# Patient Record
Sex: MALE | Race: BLACK OR AFRICAN AMERICAN | NOT HISPANIC OR LATINO | Employment: FULL TIME | URBAN - METROPOLITAN AREA
[De-identification: names, ages, dates, MRNs, and addresses within clinical notes are randomized per-mention and may not be internally consistent; named-entity substitution may affect disease eponyms.]

---

## 2023-12-27 ENCOUNTER — OFFICE VISIT (OUTPATIENT)
Dept: PRIMARY CARE | Facility: CLINIC | Age: 53
End: 2023-12-27
Payer: COMMERCIAL

## 2023-12-27 ENCOUNTER — LAB (OUTPATIENT)
Dept: LAB | Facility: LAB | Age: 53
End: 2023-12-27
Payer: COMMERCIAL

## 2023-12-27 VITALS
OXYGEN SATURATION: 96 % | HEIGHT: 70 IN | BODY MASS INDEX: 31.58 KG/M2 | DIASTOLIC BLOOD PRESSURE: 103 MMHG | RESPIRATION RATE: 18 BRPM | WEIGHT: 220.6 LBS | HEART RATE: 108 BPM | SYSTOLIC BLOOD PRESSURE: 138 MMHG

## 2023-12-27 DIAGNOSIS — H53.9 VISUAL DISTURBANCE: ICD-10-CM

## 2023-12-27 DIAGNOSIS — Z12.5 SCREENING FOR PROSTATE CANCER: ICD-10-CM

## 2023-12-27 DIAGNOSIS — I10 BENIGN ESSENTIAL HYPERTENSION: ICD-10-CM

## 2023-12-27 DIAGNOSIS — F41.1 GENERALIZED ANXIETY DISORDER: ICD-10-CM

## 2023-12-27 DIAGNOSIS — Z12.5 SCREENING FOR PROSTATE CANCER: Primary | ICD-10-CM

## 2023-12-27 DIAGNOSIS — Z00.00 HEALTHCARE MAINTENANCE: ICD-10-CM

## 2023-12-27 DIAGNOSIS — Z12.11 ENCOUNTER FOR SCREENING FOR MALIGNANT NEOPLASM OF COLON: ICD-10-CM

## 2023-12-27 DIAGNOSIS — E78.5 HYPERLIPIDEMIA, UNSPECIFIED HYPERLIPIDEMIA TYPE: ICD-10-CM

## 2023-12-27 PROBLEM — F41.0 ANXIETY ATTACK: Status: ACTIVE | Noted: 2023-12-27

## 2023-12-27 PROBLEM — B94.8 SEQUELAE OF OTHER SPECIFIED INFECTIOUS AND PARASITIC DISEASES: Status: ACTIVE | Noted: 2023-12-27

## 2023-12-27 LAB
ALBUMIN SERPL BCP-MCNC: 4.7 G/DL (ref 3.4–5)
ALP SERPL-CCNC: 78 U/L (ref 33–120)
ALT SERPL W P-5'-P-CCNC: 12 U/L (ref 10–52)
ANION GAP SERPL CALC-SCNC: 15 MMOL/L (ref 10–20)
AST SERPL W P-5'-P-CCNC: 14 U/L (ref 9–39)
BASOPHILS # BLD AUTO: 0.01 X10*3/UL (ref 0–0.1)
BASOPHILS NFR BLD AUTO: 0.1 %
BILIRUB SERPL-MCNC: 0.8 MG/DL (ref 0–1.2)
BUN SERPL-MCNC: 14 MG/DL (ref 6–23)
CALCIUM SERPL-MCNC: 10.7 MG/DL (ref 8.6–10.6)
CHLORIDE SERPL-SCNC: 102 MMOL/L (ref 98–107)
CHOLEST SERPL-MCNC: 270 MG/DL (ref 0–199)
CHOLESTEROL/HDL RATIO: 4
CO2 SERPL-SCNC: 27 MMOL/L (ref 21–32)
CREAT SERPL-MCNC: 1.19 MG/DL (ref 0.5–1.3)
EOSINOPHIL # BLD AUTO: 0.01 X10*3/UL (ref 0–0.7)
EOSINOPHIL NFR BLD AUTO: 0.1 %
ERYTHROCYTE [DISTWIDTH] IN BLOOD BY AUTOMATED COUNT: 12.3 % (ref 11.5–14.5)
EST. AVERAGE GLUCOSE BLD GHB EST-MCNC: 108 MG/DL
GFR SERPL CREATININE-BSD FRML MDRD: 74 ML/MIN/1.73M*2
GLUCOSE SERPL-MCNC: 104 MG/DL (ref 74–99)
HBA1C MFR BLD: 5.4 %
HCT VFR BLD AUTO: 49.2 % (ref 41–52)
HDLC SERPL-MCNC: 68.3 MG/DL
HGB BLD-MCNC: 16.3 G/DL (ref 13.5–17.5)
IMM GRANULOCYTES # BLD AUTO: 0.02 X10*3/UL (ref 0–0.7)
IMM GRANULOCYTES NFR BLD AUTO: 0.3 % (ref 0–0.9)
LDLC SERPL CALC-MCNC: 186 MG/DL
LYMPHOCYTES # BLD AUTO: 1.02 X10*3/UL (ref 1.2–4.8)
LYMPHOCYTES NFR BLD AUTO: 14.7 %
MCH RBC QN AUTO: 29.6 PG (ref 26–34)
MCHC RBC AUTO-ENTMCNC: 33.1 G/DL (ref 32–36)
MCV RBC AUTO: 90 FL (ref 80–100)
MONOCYTES # BLD AUTO: 0.67 X10*3/UL (ref 0.1–1)
MONOCYTES NFR BLD AUTO: 9.7 %
NEUTROPHILS # BLD AUTO: 5.2 X10*3/UL (ref 1.2–7.7)
NEUTROPHILS NFR BLD AUTO: 75.1 %
NON HDL CHOLESTEROL: 202 MG/DL (ref 0–149)
NRBC BLD-RTO: 0 /100 WBCS (ref 0–0)
PLATELET # BLD AUTO: 351 X10*3/UL (ref 150–450)
POTASSIUM SERPL-SCNC: 4.5 MMOL/L (ref 3.5–5.3)
PROT SERPL-MCNC: 7.5 G/DL (ref 6.4–8.2)
PSA SERPL-MCNC: 0.22 NG/ML
RBC # BLD AUTO: 5.5 X10*6/UL (ref 4.5–5.9)
SODIUM SERPL-SCNC: 139 MMOL/L (ref 136–145)
TRIGL SERPL-MCNC: 80 MG/DL (ref 0–149)
TSH SERPL-ACNC: 1.2 MIU/L (ref 0.44–3.98)
VLDL: 16 MG/DL (ref 0–40)
WBC # BLD AUTO: 6.9 X10*3/UL (ref 4.4–11.3)

## 2023-12-27 PROCEDURE — 36415 COLL VENOUS BLD VENIPUNCTURE: CPT

## 2023-12-27 PROCEDURE — 99396 PREV VISIT EST AGE 40-64: CPT | Performed by: INTERNAL MEDICINE

## 2023-12-27 PROCEDURE — 84443 ASSAY THYROID STIM HORMONE: CPT

## 2023-12-27 PROCEDURE — 84153 ASSAY OF PSA TOTAL: CPT

## 2023-12-27 PROCEDURE — 80053 COMPREHEN METABOLIC PANEL: CPT

## 2023-12-27 PROCEDURE — 99214 OFFICE O/P EST MOD 30 MIN: CPT | Performed by: INTERNAL MEDICINE

## 2023-12-27 PROCEDURE — 83036 HEMOGLOBIN GLYCOSYLATED A1C: CPT

## 2023-12-27 PROCEDURE — 1036F TOBACCO NON-USER: CPT | Performed by: INTERNAL MEDICINE

## 2023-12-27 PROCEDURE — 85025 COMPLETE CBC W/AUTO DIFF WBC: CPT

## 2023-12-27 PROCEDURE — 80061 LIPID PANEL: CPT

## 2023-12-27 PROCEDURE — 3080F DIAST BP >= 90 MM HG: CPT | Performed by: INTERNAL MEDICINE

## 2023-12-27 PROCEDURE — 3075F SYST BP GE 130 - 139MM HG: CPT | Performed by: INTERNAL MEDICINE

## 2023-12-27 RX ORDER — VENLAFAXINE HYDROCHLORIDE 75 MG/1
75 CAPSULE, EXTENDED RELEASE ORAL DAILY
Qty: 90 CAPSULE | Refills: 1 | Status: SHIPPED | OUTPATIENT
Start: 2023-12-27 | End: 2024-01-08 | Stop reason: SINTOL

## 2023-12-27 RX ORDER — AMLODIPINE BESYLATE 2.5 MG/1
2.5 TABLET ORAL DAILY
Qty: 90 TABLET | Refills: 0 | Status: SHIPPED | OUTPATIENT
Start: 2023-12-27 | End: 2024-03-13 | Stop reason: SDUPTHER

## 2023-12-27 RX ORDER — VENLAFAXINE HYDROCHLORIDE 75 MG/1
1 CAPSULE, EXTENDED RELEASE ORAL DAILY
COMMUNITY
Start: 2021-02-24 | End: 2023-12-27 | Stop reason: SDUPTHER

## 2023-12-27 ASSESSMENT — ENCOUNTER SYMPTOMS
DIFFICULTY URINATING: 0
SINUS PRESSURE: 0
BACK PAIN: 0
CHILLS: 0
ARTHRALGIAS: 0
DYSURIA: 0
DIARRHEA: 0
WEAKNESS: 0
APPETITE CHANGE: 0
COUGH: 0
ABDOMINAL DISTENTION: 0
VOMITING: 0
BLOOD IN STOOL: 0
MYALGIAS: 0
RHINORRHEA: 0
NUMBNESS: 0
NECK PAIN: 0
ABDOMINAL PAIN: 0
FEVER: 0
HEMATURIA: 0
CONSTIPATION: 0
SHORTNESS OF BREATH: 0
SORE THROAT: 0
JOINT SWELLING: 0
NAUSEA: 0
DIZZINESS: 0
HEADACHES: 0
FATIGUE: 0
PALPITATIONS: 0
DIAPHORESIS: 0
FREQUENCY: 0
NECK STIFFNESS: 0
LIGHT-HEADEDNESS: 0
WHEEZING: 0

## 2023-12-27 NOTE — ASSESSMENT & PLAN NOTE
-We discussed nonpharmacologic and pharmacologic mechanisms to cope with stress and anxiety  -[facility] referral for psychotherapy   -Start venlafaxine 75 mg daily  -Pt counselled on importance of taking medication for at least 6-8 weeks to determine effect  -Educated on possible adverse effects.

## 2023-12-27 NOTE — ASSESSMENT & PLAN NOTE
- LDL above goal of 90  -Repeat lipid panel  - Educated on the adverse effects of elevated cholesterol levels, benefits of medications, healthy diet and exercise

## 2023-12-27 NOTE — ASSESSMENT & PLAN NOTE
-BP above target goal 130/80  -CMP, TSH ordered  -Start amlodipine 2.5 mg daily   -Keep BP log  -Educated about adverse effects of uncontrolled blood pressure,    -Low sodium diet, regular exercise recommended

## 2023-12-27 NOTE — PROGRESS NOTES
"Subjective   Patient ID: Saud He is a 53 y.o. male who presents for Establish Care (Annual exam /Anxiety episodes /Colonoscopy referral/Blurred vision ).    HPI   He presents with his wife to establish care. 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     Review of Systems   Constitutional:  Negative for appetite change, chills, diaphoresis, fatigue and fever.   HENT:  Negative for congestion, ear discharge, ear pain, hearing loss, postnasal drip, rhinorrhea, sinus pressure, sore throat and tinnitus.    Eyes:  Negative for visual disturbance.   Respiratory:  Negative for cough, shortness of breath and wheezing.    Cardiovascular:  Negative for chest pain, palpitations and leg swelling.   Gastrointestinal:  Negative for abdominal distention, abdominal pain, blood in stool, constipation, diarrhea, nausea and vomiting.   Genitourinary:  Negative for decreased urine volume, difficulty urinating, dysuria, frequency, hematuria and urgency.   Musculoskeletal:  Negative for arthralgias, back pain, gait problem, joint swelling, myalgias, neck pain and neck stiffness.   Skin:  Negative for rash.   Neurological:  Negative for dizziness, weakness, light-headedness, numbness and headaches.       Objective   BP (!) 138/103 Comment: second attempt  Pulse 108   Resp 18   Ht 1.778 m (5' 10\")   Wt 100 kg (220 lb 9.6 oz)   SpO2 96%   BMI 31.65 kg/m²     Physical Exam  Vitals reviewed.   Constitutional:       Appearance: Normal appearance. He is normal weight.   HENT:      Right Ear: Tympanic membrane and external ear normal.      Left Ear: Tympanic membrane and external ear normal.   Eyes:      Extraocular Movements: Extraocular movements intact.      Conjunctiva/sclera: Conjunctivae normal.      Pupils: Pupils are equal, round, and reactive to light.   Cardiovascular:      Rate and Rhythm: Normal rate and regular rhythm.      Pulses: Normal pulses.   Pulmonary:      Effort: Pulmonary effort " is normal.      Breath sounds: Normal breath sounds.   Abdominal:      General: Bowel sounds are normal.      Palpations: Abdomen is soft.   Musculoskeletal:         General: Normal range of motion.      Cervical back: Normal range of motion.   Skin:     General: Skin is warm and dry.   Neurological:      General: No focal deficit present.      Mental Status: He is oriented to person, place, and time.   Psychiatric:         Mood and Affect: Mood normal.         Behavior: Behavior normal.       Assessment/Plan   Problem List Items Addressed This Visit             ICD-10-CM    Benign essential hypertension I10     -BP above target goal 130/80  -CMP, TSH ordered  -Start amlodipine 2.5 mg daily   -Keep BP log  -Educated about adverse effects of uncontrolled blood pressure,    -Low sodium diet, regular exercise recommended           Relevant Medications    amLODIPine (Norvasc) 2.5 mg tablet    Other Relevant Orders    Referral to Ophthalmology    Hyperlipidemia E78.5     - LDL above goal of 90  -Repeat lipid panel  - Educated on the adverse effects of elevated cholesterol levels, benefits of medications, healthy diet and exercise          Generalized anxiety disorder F41.1     -We discussed nonpharmacologic and pharmacologic mechanisms to cope with stress and anxiety  -[facility] referral for psychotherapy   -Start venlafaxine 75 mg daily  -Pt counselled on importance of taking medication for at least 6-8 weeks to determine effect  -Educated on possible adverse effects.            Relevant Medications    venlafaxine XR (Effexor-XR) 75 mg 24 hr capsule    Other Relevant Orders    Follow Up In [facility]    Visual disturbance H53.9    Relevant Orders    Referral to Ophthalmology    Encounter for screening for malignant neoplasm of colon Z12.11    Relevant Orders    Cologuard® colon cancer screening    Screening for prostate cancer - Primary Z12.5    Relevant Orders     Prostate Specific Antigen, Screen    Healthcare maintenance Z00.00    Relevant Orders    CBC and Auto Differential    Comprehensive Metabolic Panel    Hemoglobin A1C    Lipid Panel    TSH with reflex to Free T4 if abnormal   RTC in 6-8 weeks

## 2023-12-28 ENCOUNTER — TELEPHONE (OUTPATIENT)
Dept: PRIMARY CARE | Facility: CLINIC | Age: 53
End: 2023-12-28
Payer: COMMERCIAL

## 2023-12-28 NOTE — RESULT ENCOUNTER NOTE
Your Cholesterol levels are elevated.  I recommend eating a healthy diet, avoiding foods high in saturated fats and processed sugars, as well as exercising regularly.  If levels remain elevated at next repeat we can discuss starting medications to decrease cholesterol levels as well as risk of stroke and heart attack.     Labs were otherwise normal.

## 2024-01-04 ENCOUNTER — APPOINTMENT (OUTPATIENT)
Dept: PRIMARY CARE | Facility: CLINIC | Age: 54
End: 2024-01-04
Payer: COMMERCIAL

## 2024-01-08 ENCOUNTER — TELEMEDICINE (OUTPATIENT)
Dept: PRIMARY CARE | Facility: CLINIC | Age: 54
End: 2024-01-08
Payer: COMMERCIAL

## 2024-01-08 DIAGNOSIS — I10 BENIGN ESSENTIAL HYPERTENSION: ICD-10-CM

## 2024-01-08 DIAGNOSIS — F41.1 GENERALIZED ANXIETY DISORDER: Primary | ICD-10-CM

## 2024-01-08 PROCEDURE — RXMED WILLOW AMBULATORY MEDICATION CHARGE

## 2024-01-08 PROCEDURE — 99213 OFFICE O/P EST LOW 20 MIN: CPT | Performed by: INTERNAL MEDICINE

## 2024-01-08 RX ORDER — BUSPIRONE HYDROCHLORIDE 15 MG/1
15 TABLET ORAL 2 TIMES DAILY
Qty: 180 TABLET | Refills: 0 | Status: SHIPPED | OUTPATIENT
Start: 2024-01-08 | End: 2024-03-13 | Stop reason: SDUPTHER

## 2024-01-08 NOTE — PROGRESS NOTES
Subjective   Patient ID: Saud He is a 53 y.o. male who presents for Follow-up (Medication concerns).    HPI   He was having erectile dysfunction on venlafaxine.    Buspirone      Review of Systems  12 point ROS completed, negative except for mentioned in HPI      Objective   There were no vitals taken for this visit.    Physical Exam  No physical exam was done due to the virtual nature of this visit.      Assessment/Plan   Problem List Items Addressed This Visit             ICD-10-CM    Benign essential hypertension I10     Patient reporting BP well controlled on amlodipine 2.5 mg daily          Generalized anxiety disorder - Primary F41.1     Discontinue venlafaxine due to erectile dysfunction  Start buspirone 15 mg BID   Referred to [facility]            Relevant Medications    busPIRone (Buspar) 15 mg tablet    Other Relevant Orders    Follow Up In [facility]     RTC in 2 mo

## 2024-01-08 NOTE — ASSESSMENT & PLAN NOTE
Discontinue venlafaxine due to erectile dysfunction  Start buspirone 15 mg BID   Referred to [facility]

## 2024-01-09 ENCOUNTER — PHARMACY VISIT (OUTPATIENT)
Dept: PHARMACY | Facility: CLINIC | Age: 54
End: 2024-01-09
Payer: COMMERCIAL

## 2024-01-16 ENCOUNTER — SOCIAL WORK (OUTPATIENT)
Dept: PRIMARY CARE | Facility: CLINIC | Age: 54
End: 2024-01-16
Payer: COMMERCIAL

## 2024-01-16 ASSESSMENT — PATIENT HEALTH QUESTIONNAIRE - PHQ9
SUM OF ALL RESPONSES TO PHQ QUESTIONS 1-9: 0
7. TROUBLE CONCENTRATING ON THINGS, SUCH AS READING THE NEWSPAPER OR WATCHING TELEVISION: NOT AT ALL
4. FEELING TIRED OR HAVING LITTLE ENERGY: NOT AT ALL
1. LITTLE INTEREST OR PLEASURE IN DOING THINGS: NOT AT ALL
10. IF YOU CHECKED OFF ANY PROBLEMS, HOW DIFFICULT HAVE THESE PROBLEMS MADE IT FOR YOU TO DO YOUR WORK, TAKE CARE OF THINGS AT HOME, OR GET ALONG WITH OTHER PEOPLE: NOT DIFFICULT AT ALL
9. THOUGHTS THAT YOU WOULD BE BETTER OFF DEAD, OR OF HURTING YOURSELF: NOT AT ALL
6. FEELING BAD ABOUT YOURSELF - OR THAT YOU ARE A FAILURE OR HAVE LET YOURSELF OR YOUR FAMILY DOWN: NOT AT ALL
8. MOVING OR SPEAKING SO SLOWLY THAT OTHER PEOPLE COULD HAVE NOTICED. OR THE OPPOSITE, BEING SO FIGETY OR RESTLESS THAT YOU HAVE BEEN MOVING AROUND A LOT MORE THAN USUAL: NOT AT ALL
5. POOR APPETITE OR OVEREATING: NOT AT ALL
3. TROUBLE FALLING OR STAYING ASLEEP: NOT AT ALL
2. FEELING DOWN, DEPRESSED OR HOPELESS: NOT AT ALL
SUM OF ALL RESPONSES TO PHQ9 QUESTIONS 1 & 2: 0

## 2024-01-16 ASSESSMENT — ANXIETY QUESTIONNAIRES
6. BECOMING EASILY ANNOYED OR IRRITABLE: NOT AT ALL
4. TROUBLE RELAXING: NOT AT ALL
2. NOT BEING ABLE TO STOP OR CONTROL WORRYING: SEVERAL DAYS
3. WORRYING TOO MUCH ABOUT DIFFERENT THINGS: NOT AT ALL
IF YOU CHECKED OFF ANY PROBLEMS ON THIS QUESTIONNAIRE, HOW DIFFICULT HAVE THESE PROBLEMS MADE IT FOR YOU TO DO YOUR WORK, TAKE CARE OF THINGS AT HOME, OR GET ALONG WITH OTHER PEOPLE: SOMEWHAT DIFFICULT
5. BEING SO RESTLESS THAT IT IS HARD TO SIT STILL: NOT AT ALL
7. FEELING AFRAID AS IF SOMETHING AWFUL MIGHT HAPPEN: NOT AT ALL
1. FEELING NERVOUS, ANXIOUS, OR ON EDGE: MORE THAN HALF THE DAYS
GAD7 TOTAL SCORE: 3
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
## 2024-01-18 ENCOUNTER — DOCUMENTATION (OUTPATIENT)
Dept: BEHAVIORAL HEALTH | Facility: CLINIC | Age: 54
End: 2024-01-18
Payer: COMMERCIAL

## 2024-01-18 NOTE — PROGRESS NOTES
[facility] Psychiatry Consult Note     Saud He is a 53 y.o., referred to Collaborative Care for symptoms of depression and anxiety. I have reviewed the patient with the behavioral health manager and reviewed the patient's electronic record.    Recommendations:   [facility] will conduct therapy for anxiety, especially with exposures, and encourage more intense cardio exercise, which decreases anxiety sensitivity.  Please follow medication algorithm here.  1. If no improvement with buspirone, taper and replace with TCA. If partial but insufficient improvement with buspirone, may augment with TCA: Clompiramine has best evidence for OCD.  2. May augment or replace subsequently with either pregabalin or a low-dose antipsychotic with anxiolytic properties, such as aripiprazole (low metabolic adverse effects) or quetiapine (more sedative effects can help with insomnia), usually for short-term use.    Clomipramine (Anafranil)  ====================  DOSING INFORMATION:   Week 1: Baseline EKG (if any history of cardiac disease, history of arrhythmias, or over 66 y/o), pulse, HR, weight. Consider BMP for baseline sodium in older adults. Start: 25 mg qHS-should be given with food and dose may be divided to limit GI effects.  Week 2: increase to 50 mg qHS.   Week 3: increase to 75 mg qHS.   Week 4: increase dose to an Initial Target Dose of 100 mg qHS.   Max Dose: 250 mg/day.   Discontinuation: 25% per week to 25% per month depending on length of treatment in order to minimize withdrawal symptoms and relapse.  MONITORING:   EKG (pretreatment, initial, and annual-if any history of cardiac disease, history of arrhythmias or over 66 y/o), pulse, HR, weight. Consider posttreatment BMP to rule out hyponatremia in older adults. Blood test for serum level available with defined therapeutic range: 150- 300 ng/ml; Toxic > 500 ng/ml. Blood draw timed to achieve a trough level, goal approximately 12 hours after last dose.  GENERAL INFORMATION:    Mechanism of Action: TCA, tertiary amine: serotonin >> NE reuptake inhibitor.   FDA Indications: OCD.   Off-Label Indications: Depression.   Pharmacokinetics: T½: 32 hr. Common Side Effects (OCD): dry mouth (84%), somnolence (54%), dizziness (54%), tremor (54%), headache (52%), constipation (47%), ejaculation failure (42%), fatigue (39%), nausea (33%), increased sweating (29%), dyspepsia (22%), libido change (20%), impotence (20%), weight gain (18%), nervousness (18%), abnormal vision (18%), micturition disorder (14%), increased appetite (11%), paresthesia (9%), memory impairment (9%), anxiety (9%), rash (8%), vomiting (7%), twitching (7%), flatulence (6%), impaired concentration (5%), depression (5%). Black Box Warning: increased SI in patients <24 y/o.   Contraindications: Use of a MAOI within 14 days of stopping Anafranil, concurrent use of a MAOI including drugs with significant MAOI activity (e.g., linezolid), or use of Anafranil within 14 days of stopping a MAOI, use during the acute recovery period after a MI.   Warnings and Precautions: Clinical worsening and suicide risk, highly lethal in overdose, activation of hypomania/ramona, serotonin syndrome, seizures, orthostatic hypotension, caution in patients with known cardiovascular disease, neuropsychiatric symptoms, caution and monitoring in patients with liver disease, decreased blood cell count, hyperthermia, sexual dysfunction, weight gain, caution in patients with hyperthyroidism/thyroid supplementation, increased intraocular pressure, a history of narrowangel glaucoma, urinary retention, with tumors of the adrenal medulla, or with impaired renal function, discontinuation syndrome. Potentially prolongs QTc so caution is advised in patients with cardiovascular disease.   Metabolism/Pharmacogenomics: Metabolized by 2D6 to less active metabolites and by 2C19 to active metabolites. Use caution with 2D6 poor metabolizers.   Significant drug-drug interactions:  use with caution with 2D6 inhibitors (e.g., fluoxetine and paroxetine); check all drug-drug interactions before prescribing.    Pregabalin (Lyrica)  ================  - has effects for anxiety reportedly comparable to benzodiazepines, but clinical experience varies.  - Dosin mg/day in 2 to 3 divided doses; may increase based on response and tolerability at weekly intervals in increments of 150 mg/day up to a usual dose of 300 mg/day. May further increase up to 600 mg/day; however, additional benefit of doses >300 mg/day is uncertain  - AEs can include neuro (sedation, dizziness, tremor, irritability); dry mouth; blurred vision, and peripheral edema.  - The  does report potential weight gain, but this was 5 pounds over the course of two years among patients with advanced diabetes (neuropathy).  - Pregabalin does cross the placenta, but studies of effects on pregnancy are too limited to see any effect and research is ongoing.  - Pregabalin does have abuse potential, primarily for patients with pre-existing substance use disorders, and use in that population must be cautious and entail documentation of risks and benefits.    Quetiapine (Seroquel)  ==================  DOSING INFORMATION:   Assess baseline weight, waist circumference, blood pressure, fasting plasma glucose, fasting lipid profile, CBC (for baseline WBC), EKG (to assess QTc) and AIMS test.   Initiation for Anxiety: Start with 25 mg at bedtime and increase by up to 50 mg weekly to a target dose of  mg per day.  ONGOING MONITORING:   EKG at target dose (at least once to assess QTc). At 4 weeks: Weight, Fasting lipid profile.   At 8 weeks: weight.   At 12 weeks: weight, blood pressure, fasting plasma glucose, fasting lipid profile.   Quarterly thereafter: weight.   Annually /ongoing: Waist circumference, weight, blood pressure, fasting plasma glucose, fasting lipid profile and AIMS test. Consider checking for cataracts.  GENERAL  INFORMATION: Atypical antipsychotic.   FDA Indications: Schizophrenia (IR, XR), Bipolar I - manic (IR, XR), Bipolar I - mixed (XR), Bipolar disorder - depressive episode (IR, XR), Bipolar maintenance as adjunctive to lithium or divalproex (IR, XR), Adjunctive treatment of MDD (XR).   Off-Label Indications: Anxiety disorders augmentation.   Pharmacokinetics: T½ = 6 hr (IR); 7 hrs (XR).   Common Side Effects (Schizophrenia and Bipolar Christina-Seroquel IR): Headache (21%), somnolence (18%), dizziness (11%), dry mouth (9%), constipation (8%), weight gain (5%), dyspepsia (5%), ALT increased (5%).   Common Side Effects (Bipolar Depression-Seroquel XR): Somnolence (52%), dry mouth (37%), Increased appetite (12%), dyspepsia (7%), weight gain (7%), fatigue (6%). Black Box Warnings: (1) Increased mortality in elderly patients with dementia related psychosis. (2) Increased risk of suicidal thinking and behavior in children, adolescents, and young adults taking antidepressants. (3) Monitor for worsening and emergence of suicidal thoughts and behaviors.       Diagnosis:  CARMINA vs OCD with panic attacks  Rule out ASD  His primary concerns are the panic attacks and anxiety about having them.    Requires prompting/encouragement in order to answer questions, complete paperwork, etc.  Endorses perfectionism  Panic attacks and anxiety about having anxiety when not at home, starting when Trump was elected.  Flat affect but laughed once  Witnessed traumas growing up; best friend killed in front of him  Adverse effects with SSRI and SNRI trials      Patient Health Questionnaire-9 Score: 0 (1/16/2024  1:00 PM)  CARMINA-7 Total Score: 3 (1/16/2024  1:00 PM)      The above treatment considerations and suggestions are based on consultations with the patient's care manager and a review of information available in the electronic medical record. I have not personally examined the patient. All recommendations should be implemented with consideration of  the patient's relevant prior history and current clinical status. Please feel free to call me with any questions about the care of this patient. I can easily be reached through [employer] Chat.

## 2024-01-30 ENCOUNTER — DOCUMENTATION (OUTPATIENT)
Dept: PRIMARY CARE | Facility: CLINIC | Age: 54
End: 2024-01-30

## 2024-01-30 ENCOUNTER — SOCIAL WORK (OUTPATIENT)
Dept: PRIMARY CARE | Facility: CLINIC | Age: 54
End: 2024-01-30
Payer: COMMERCIAL

## 2024-01-30 DIAGNOSIS — F41.1 GENERALIZED ANXIETY DISORDER WITH PANIC ATTACKS: Primary | ICD-10-CM

## 2024-01-30 DIAGNOSIS — F41.0 GENERALIZED ANXIETY DISORDER WITH PANIC ATTACKS: Primary | ICD-10-CM

## 2024-01-30 PROCEDURE — 99494 1ST/SBSQ PSYC COLLAB CARE: CPT | Performed by: INTERNAL MEDICINE

## 2024-01-30 PROCEDURE — 99492 1ST PSYC COLLAB CARE MGMT: CPT | Performed by: INTERNAL MEDICINE

## 2024-01-30 NOTE — PROGRESS NOTES
"Collaborative Care (CoCM)  Progress Note    Type of Interaction: In Office    Start Time: 11a    End Time: 12p        Appointment: Scheduled    Reason for Visit: No chief complaint on file.         Interval History / Patient Symptoms: Patient reports he has been able to manage his everyday life better. Patient reports he is still able to function. Patient reports his symptoms have decreased and are not as \"bothersome\" as they had been.  Patient BHM discussed anxiety and the definition. Patient has been taking his meds daily. Patient reports his main fear is having the anxiety. Patient reports he as been working on understanding his thoughts and connections to his behaviors.Patient describes the wave of emotions can overwhelm him in the moment. Patient states as soon as it comes, it will leave. Patient and BHM discussed patient doing treadmill exercises 2xs per week for cardio and balancing yoga. Patient and BHM discussed avoidance and he does not feel like he has a hard time dealing with social activities. Patient and BHM discussed the cycle of anxiety. Patient and BHM discussed decompressing and winding down after work. Patient reports he he does not feel settled until he takes care of what needs to be taken care of at home and then he can relax. Patient reports he is always anticipating the next bad thing that is going to happen. Patient reports he functioned in the past by imaging the worst case scenario. Patient and BHM discussed the hand out what could happen vs. What will happen. Patient reports he often about financially situations that could eventually cost him money in the end. Patient is a worrier  about most things.  Patient and discussed that we can not control other people's actions, reactions and behaviors.     Patient Health Questionnaire-9 Score: 0 (1/16/2024  1:00 PM)  CARMINA-7 Total Score: 3 (1/16/2024  1:00 PM)        Interventions Provided: Behavioral Activation, Develop Coping Strategies, Review " Progress/Goals Stress Management, and Mindfulness      Progress Made: Mixed    Response to Intervention: Patient is calm and cooperative.  Patient participates in processing how his thoughts impact his feelings and reactions and behaviors. Patient reports that he is an advocate for mental health and wishes it was not such a stigma. Patient asks questions about his care and is an active advocate for himself.                  Follow Up / Next Appointment:      2/12/2024@[facility]

## 2024-02-01 LAB — NONINV COLON CA DNA+OCC BLD SCRN STL QL: NEGATIVE

## 2024-02-12 ENCOUNTER — SOCIAL WORK (OUTPATIENT)
Dept: PRIMARY CARE | Facility: CLINIC | Age: 54
End: 2024-02-12
Payer: COMMERCIAL

## 2024-02-12 ASSESSMENT — PATIENT HEALTH QUESTIONNAIRE - PHQ9
SUM OF ALL RESPONSES TO PHQ QUESTIONS 1-9: 0
2. FEELING DOWN, DEPRESSED OR HOPELESS: NOT AT ALL
10. IF YOU CHECKED OFF ANY PROBLEMS, HOW DIFFICULT HAVE THESE PROBLEMS MADE IT FOR YOU TO DO YOUR WORK, TAKE CARE OF THINGS AT HOME, OR GET ALONG WITH OTHER PEOPLE: NOT DIFFICULT AT ALL
SUM OF ALL RESPONSES TO PHQ9 QUESTIONS 1 & 2: 0
9. THOUGHTS THAT YOU WOULD BE BETTER OFF DEAD, OR OF HURTING YOURSELF: NOT AT ALL
5. POOR APPETITE OR OVEREATING: NOT AT ALL
6. FEELING BAD ABOUT YOURSELF - OR THAT YOU ARE A FAILURE OR HAVE LET YOURSELF OR YOUR FAMILY DOWN: NOT AT ALL
1. LITTLE INTEREST OR PLEASURE IN DOING THINGS: NOT AT ALL
8. MOVING OR SPEAKING SO SLOWLY THAT OTHER PEOPLE COULD HAVE NOTICED. OR THE OPPOSITE, BEING SO FIGETY OR RESTLESS THAT YOU HAVE BEEN MOVING AROUND A LOT MORE THAN USUAL: NOT AT ALL
7. TROUBLE CONCENTRATING ON THINGS, SUCH AS READING THE NEWSPAPER OR WATCHING TELEVISION: NOT AT ALL
3. TROUBLE FALLING OR STAYING ASLEEP: NOT AT ALL
4. FEELING TIRED OR HAVING LITTLE ENERGY: NOT AT ALL

## 2024-02-12 ASSESSMENT — ANXIETY QUESTIONNAIRES
GAD7 TOTAL SCORE: 4
3. WORRYING TOO MUCH ABOUT DIFFERENT THINGS: NOT AT ALL
4. TROUBLE RELAXING: NOT AT ALL
1. FEELING NERVOUS, ANXIOUS, OR ON EDGE: SEVERAL DAYS
IF YOU CHECKED OFF ANY PROBLEMS ON THIS QUESTIONNAIRE, HOW DIFFICULT HAVE THESE PROBLEMS MADE IT FOR YOU TO DO YOUR WORK, TAKE CARE OF THINGS AT HOME, OR GET ALONG WITH OTHER PEOPLE: NOT DIFFICULT AT ALL
5. BEING SO RESTLESS THAT IT IS HARD TO SIT STILL: SEVERAL DAYS
6. BECOMING EASILY ANNOYED OR IRRITABLE: SEVERAL DAYS
7. FEELING AFRAID AS IF SOMETHING AWFUL MIGHT HAPPEN: SEVERAL DAYS
2. NOT BEING ABLE TO STOP OR CONTROL WORRYING: NOT AT ALL

## 2024-02-12 NOTE — PROGRESS NOTES
[facility]  [facility]    Type of Interaction: In Office    Start Time: 11a    End Time: 12p        Appointment: Scheduled    Reason for Visit: No chief complaint on file.   CARMINA      Interval History / Patient Symptoms: Patient has been doing research on the symptoms of anxiety. Patient has been working on letting things go that would typically upset him. Patient reports there are times he wakes up anxious and he can explain how his body reacts to what he is thinking.  Patient reports he can over think the process of making decisions. Patient reports he fears anxiety and the way it makes him feel. Patient reports he worries about how the anxiety makes him feel and he is worried about those feelings arising.  Patient has been going to the track and to the park to do cardio.  Patient reports he has been feeling good. Patient reports his sleep has been good. Patient reports his energy has been better. Patient has noticed his interest has peaked and is better than it was when he first started speaking with M. Patient reports he has been to the track and the park to walk. Patient is doing yoga daily and patient does the meditation a few times per week for 20 minutes. Patient reports he has been walking several times per week. Patient wonders if his eating can be tied into his anxiety. Patient reports when his mom passed they did a wellness check on her. Patient reports several months later he lost his dad and watched him pass away. Patient reports his past choices and close call situations didn't trigger anxiety for him.  Patient wonders what causes him to be anxious and is something contributing to it that he has done. Patient and BHM reviewed cognitive distortions. Patient discussed how difficult it can be for him to make a decision. Patient gave examples of how he ruminates over a decision. Patient reports he is not spontaneous. Patient and BHM discussed mindfulness and living in the moment.  Patient often times wants to know what his triggers are and if he can stop them. Discussed with patient that at times he may not know what triggers him and that facing the anxiety in the moment and identifying how he is feeling and not avoiding the symptoms at that time will help.    No data recorded      Interventions Provided: Develop Coping Strategies and Review Progress/Goals Stress Management      Progress Made: Significant    Response to Intervention:   Patient is engaging. Patient asks clarifying questions in order to manage his symptoms. Patient has been doing more cardio exercises and increased his walking as well.                 Follow Up / Next Appointment:    2/27/2024@[facility]

## 2024-02-15 ENCOUNTER — APPOINTMENT (OUTPATIENT)
Dept: PRIMARY CARE | Facility: CLINIC | Age: 54
End: 2024-02-15
Payer: COMMERCIAL

## 2024-02-27 ENCOUNTER — SOCIAL WORK (OUTPATIENT)
Dept: PRIMARY CARE | Facility: CLINIC | Age: 54
End: 2024-02-27
Payer: COMMERCIAL

## 2024-02-27 NOTE — PROGRESS NOTES
[facility]  [facility]    Type of Interaction: In Office    Start Time: 11a    End Time: 12p        Appointment: Scheduled    Reason for Visit: No chief complaint on file.  CARMINA      Interval History / Patient Symptoms: Patient reports he felt on edge, nauseous, light headed yesterday. Patient is not certain where his anxiety comes from. BHM focused with patient on mindfulness and living in the moment examples and techniques. Patient states yesterday, the wave of emotions was not present as it has been in the past. Patient reports he is confused as to why he becomes anxious for no particular reason. Patient reports he used breathing techniques to help himself get through the feelings. Patient reports he can become stuck in his thoughts and he has a hard time letting go of a thought and will ruminate in it.  BHM and patient discussed the idea 'are the thoughts helpful'?  and he recognizes that most of the time the thoughts are not helpful. Patient and BHM discussed patient using cardio walking to help with decreasing his BP. Patient reports he eats well and sleeps well and has been doing yoga and cardio. Patient states he is med compliant. Patient reports he is working on changing his lifestyle in order to be healthy mentally and physically.  Patient and BHM discussed cognitive distortions as well as the cycle of anxiety. Patient and BHM discussed things that can make him unsettled at home. Patient does not like things to be out of place or messy. BHM and patient discussed if this disturbs his ability to relax and function. Patient reports it depends, but it can at times. [facility] is working with patient to expose him to uncomfortable situations. Patient is going to work on leaving things messy or untidy and to rate how he is feeling as far as his anxiety. Patient reports at times he can get uncomfortable getting on the freeway and driving but has has made an effort not to avoid doing this. We discussed  patient labeling his emotions as they happen and to continue using the freeway. We discussed how avoidance can make things worse over time and how people can get caught in the cycle of avoidance making things worse.    No data recorded      Interventions Provided: Behavioral Activation, Develop Coping Strategies, Review Progress/Goals Stress Management, and Mindfulness      Progress Made: Mixed    Response to Intervention:  Patient is engaging and willing to process. Patient does recognize that at times his own thoughts can hinder his process of moving forward and hinder his thinking. He knows this in turn can impact his symptoms of anxiety.                  Follow Up / Next Appointment:    3/12/2024@12:15p

## 2024-02-29 ENCOUNTER — DOCUMENTATION (OUTPATIENT)
Dept: PRIMARY CARE | Facility: CLINIC | Age: 54
End: 2024-02-29
Payer: COMMERCIAL

## 2024-02-29 DIAGNOSIS — F41.1 GAD (GENERALIZED ANXIETY DISORDER): Primary | ICD-10-CM

## 2024-02-29 PROCEDURE — 99494 1ST/SBSQ PSYC COLLAB CARE: CPT | Performed by: INTERNAL MEDICINE

## 2024-02-29 PROCEDURE — 99493 SBSQ PSYC COLLAB CARE MGMT: CPT | Performed by: INTERNAL MEDICINE

## 2024-03-11 ENCOUNTER — OFFICE VISIT (OUTPATIENT)
Dept: PRIMARY CARE | Facility: CLINIC | Age: 54
End: 2024-03-11
Payer: COMMERCIAL

## 2024-03-11 VITALS
WEIGHT: 218 LBS | RESPIRATION RATE: 18 BRPM | HEART RATE: 88 BPM | DIASTOLIC BLOOD PRESSURE: 90 MMHG | SYSTOLIC BLOOD PRESSURE: 134 MMHG | BODY MASS INDEX: 31.21 KG/M2 | HEIGHT: 70 IN | OXYGEN SATURATION: 98 %

## 2024-03-11 DIAGNOSIS — F41.1 GENERALIZED ANXIETY DISORDER: ICD-10-CM

## 2024-03-11 DIAGNOSIS — I10 BENIGN ESSENTIAL HYPERTENSION: Primary | ICD-10-CM

## 2024-03-11 PROCEDURE — 99214 OFFICE O/P EST MOD 30 MIN: CPT | Performed by: INTERNAL MEDICINE

## 2024-03-11 PROCEDURE — 3075F SYST BP GE 130 - 139MM HG: CPT | Performed by: INTERNAL MEDICINE

## 2024-03-11 PROCEDURE — 1036F TOBACCO NON-USER: CPT | Performed by: INTERNAL MEDICINE

## 2024-03-11 PROCEDURE — 3080F DIAST BP >= 90 MM HG: CPT | Performed by: INTERNAL MEDICINE

## 2024-03-11 ASSESSMENT — ENCOUNTER SYMPTOMS
NUMBNESS: 0
APPETITE CHANGE: 0
NECK STIFFNESS: 0
SHORTNESS OF BREATH: 0
CONSTIPATION: 0
DYSURIA: 0
FEVER: 0
VOMITING: 0
DIZZINESS: 0
SINUS PRESSURE: 0
WEAKNESS: 0
RHINORRHEA: 0
WHEEZING: 0
FATIGUE: 0
ABDOMINAL PAIN: 0
DIAPHORESIS: 0
MYALGIAS: 0
DIFFICULTY URINATING: 0
BLOOD IN STOOL: 0
CHILLS: 0
NECK PAIN: 0
NAUSEA: 0
ABDOMINAL DISTENTION: 0
DIARRHEA: 0
JOINT SWELLING: 0
LOSS OF SENSATION IN FEET: 0
PALPITATIONS: 0
OCCASIONAL FEELINGS OF UNSTEADINESS: 0
BACK PAIN: 0
FREQUENCY: 0
SORE THROAT: 0
COUGH: 0
HEMATURIA: 0
LIGHT-HEADEDNESS: 0
ARTHRALGIAS: 0
DEPRESSION: 0
HEADACHES: 0

## 2024-03-11 ASSESSMENT — PATIENT HEALTH QUESTIONNAIRE - PHQ9
1. LITTLE INTEREST OR PLEASURE IN DOING THINGS: NOT AT ALL
SUM OF ALL RESPONSES TO PHQ9 QUESTIONS 1 AND 2: 0
2. FEELING DOWN, DEPRESSED OR HOPELESS: NOT AT ALL

## 2024-03-11 NOTE — PROGRESS NOTES
"Subjective   Patient ID: Saud He is a 53 y.o. male who presents for Follow-up.    HPI   He presents for follow-up. He reports anxiety has been doing better with psychotherapy, using coping mechanisms.     Review of Systems   Constitutional:  Negative for appetite change, chills, diaphoresis, fatigue and fever.   HENT:  Negative for congestion, ear discharge, ear pain, hearing loss, postnasal drip, rhinorrhea, sinus pressure, sore throat and tinnitus.    Eyes:  Negative for visual disturbance.   Respiratory:  Negative for cough, shortness of breath and wheezing.    Cardiovascular:  Negative for chest pain, palpitations and leg swelling.   Gastrointestinal:  Negative for abdominal distention, abdominal pain, blood in stool, constipation, diarrhea, nausea and vomiting.   Genitourinary:  Negative for decreased urine volume, difficulty urinating, dysuria, frequency, hematuria and urgency.   Musculoskeletal:  Negative for arthralgias, back pain, gait problem, joint swelling, myalgias, neck pain and neck stiffness.   Skin:  Negative for rash.   Neurological:  Negative for dizziness, weakness, light-headedness, numbness and headaches.         Objective   /90   Pulse 88   Resp 18   Ht 1.778 m (5' 10\")   Wt 98.9 kg (218 lb)   SpO2 98%   BMI 31.28 kg/m²     Physical Exam  Vitals reviewed.   Constitutional:       Appearance: Normal appearance. He is normal weight.   HENT:      Right Ear: Tympanic membrane and external ear normal.      Left Ear: Tympanic membrane and external ear normal.   Eyes:      Extraocular Movements: Extraocular movements intact.      Conjunctiva/sclera: Conjunctivae normal.      Pupils: Pupils are equal, round, and reactive to light.   Cardiovascular:      Rate and Rhythm: Normal rate and regular rhythm.      Pulses: Normal pulses.   Pulmonary:      Effort: Pulmonary effort is normal.      Breath sounds: Normal breath sounds.   Abdominal:      General: Bowel sounds are normal.      " Palpations: Abdomen is soft.   Musculoskeletal:         General: Normal range of motion.      Cervical back: Normal range of motion.   Skin:     General: Skin is warm and dry.   Neurological:      General: No focal deficit present.      Mental Status: He is oriented to person, place, and time.   Psychiatric:         Mood and Affect: Mood normal.         Behavior: Behavior normal.           Assessment/Plan   Problem List Items Addressed This Visit             ICD-10-CM    Benign essential hypertension - Primary I10     BP log reviewed   Continue amlodipine 2.5 mg daily   Low sodium diet          Generalized anxiety disorder F41.1     Discontinue venlafaxine due to erectile dysfunction  Continue buspirone 15 mg BID   Continue psychotherapy with Odalys Douglas           RTSHANA in 6 mo

## 2024-03-11 NOTE — ASSESSMENT & PLAN NOTE
Discontinue venlafaxine due to erectile dysfunction  Continue buspirone 15 mg BID   Continue psychotherapy with Odalys Douglas

## 2024-03-12 ENCOUNTER — SOCIAL WORK (OUTPATIENT)
Dept: PRIMARY CARE | Facility: CLINIC | Age: 54
End: 2024-03-12
Payer: COMMERCIAL

## 2024-03-12 NOTE — PROGRESS NOTES
[facility]  [facility]    Type of Interaction: In Office    Start Time: 12:20p    End Time: 1:20p        Appointment: Scheduled    Reason for Visit: No chief complaint on file.         Interval History / Patient Symptoms: Patient reports when he has spur of the moment things can get him more anxious. Patient reports the feelings of needing to get money right in the moment is not necessary. Patient reports it is hard or him to slow down when he has started. Patient reports he feel tense in his shoulders and on edge and tense. Patient has been working on living in the moment and enjoying the hear and now. Patient has been working on being more mindful. Patient reports he is more aware of what he is doing in the moment.  Patient reports the more things he is able to let go of he feels lighter. Patient states he has been working on stopping and not fearing his anxiety and that he has been able to work through it. Patient reports he has been 2-3 day of walking. Patient has been doing his yoga about 20 minutes per day.  Patient reports his wave of anxiety would take over and he would \"freeze up\". It has not been happening as frequently. Patient reports when it happens it is just going in a small wave. Patient reports less frequency and working through it and managing the way he feels. Patient and M discussed breathing exercises and how he has been managing the symptoms.  Patient has been working on mindfulness exercises. Patient describes how he feels like he has developed a negative mindset can impact his positive thought processes. Patient and BHM  reviewed daily affirmations and gratitude to add to his daily routine.    No data recorded      Interventions Provided: Develop Coping Strategies, Review Progress/Goals Stress Management, Mindfulness, and Homework F/U      Progress Made: Mixed    Response to Intervention: Patient was calm and cooperative. Patient was engaging. Patient was able to process " his feelings in connection to his emotions and behaviors. Patient was able to link how coping skills provided have helped in the reduction of his anxious symptoms.               Follow Up / Next Appointment:    3/26/2024@12:15p

## 2024-03-13 DIAGNOSIS — F41.1 GENERALIZED ANXIETY DISORDER: ICD-10-CM

## 2024-03-13 DIAGNOSIS — I10 BENIGN ESSENTIAL HYPERTENSION: ICD-10-CM

## 2024-03-14 RX ORDER — AMLODIPINE BESYLATE 2.5 MG/1
2.5 TABLET ORAL DAILY
Qty: 90 TABLET | Refills: 0 | Status: SHIPPED | OUTPATIENT
Start: 2024-03-14 | End: 2024-06-13

## 2024-03-14 RX ORDER — BUSPIRONE HYDROCHLORIDE 15 MG/1
15 TABLET ORAL 2 TIMES DAILY
Qty: 180 TABLET | Refills: 0 | Status: SHIPPED | OUTPATIENT
Start: 2024-03-14 | End: 2025-03-14

## 2024-03-15 ENCOUNTER — PHARMACY VISIT (OUTPATIENT)
Dept: PHARMACY | Facility: CLINIC | Age: 54
End: 2024-03-15
Payer: COMMERCIAL

## 2024-03-15 PROCEDURE — RXMED WILLOW AMBULATORY MEDICATION CHARGE

## 2024-03-26 ENCOUNTER — SOCIAL WORK (OUTPATIENT)
Dept: PRIMARY CARE | Facility: CLINIC | Age: 54
End: 2024-03-26
Payer: COMMERCIAL

## 2024-03-26 NOTE — PROGRESS NOTES
Collaborative Care (CoCM)  Progress Note    Type of Interaction: In Office    Start Time: 12:15p    End Time: 1:15p        Appointment: Scheduled    Reason for Visit: No chief complaint on file.   CARMINA      Interval History / Patient Symptoms: Patient and BHM reviewed cognitive distortions and how they can impact his behaviors and reactions. Patient and BHM discussed when he has a certain thoughts and feelings he may disengage from certain activities.  Patient and BHM discussed how positive thoughts can impact his feelings and then his behaviors. Patient reports he has been thinking about the positive pleasant things from his past as opposed to the negative feelings. Patient has noticed a reduction in his symptoms of anxiety and his reactions and behaviors. Patient recently has taken a bath and shower to help him relax and enjoy himself. Patient and BHM discussed a balance when working on self care. Patient has been rotating doing yoga and cardio. Patient and BHM discussed how the use of guided mediation has helped him. Patient states that he really found it to be helpful in the beginning. Patient is working on doing things that he enjoys doing. Patient reports he has been practicing gratitude and is being more open to a better balance in his life.    No data recorded      Interventions Provided: Develop Coping Strategies, Review Progress/Goals Stress Management, and Mindfulness      Progress Made: Mixed    Response to Intervention: Patient is calm and cooperative. Patient is open to process how his feelings impact him. Patient to work on a balance with his working out and meditations. Patient does feel like these activities have helped him to reduce his anxiety.              Follow Up / Next Appointment:    4/15/2024@12:15p

## 2024-03-27 ENCOUNTER — DOCUMENTATION (OUTPATIENT)
Dept: PRIMARY CARE | Facility: CLINIC | Age: 54
End: 2024-03-27
Payer: COMMERCIAL

## 2024-03-27 DIAGNOSIS — F41.1 GENERALIZED ANXIETY DISORDER WITH PANIC ATTACKS: Primary | ICD-10-CM

## 2024-03-27 DIAGNOSIS — F41.0 GENERALIZED ANXIETY DISORDER WITH PANIC ATTACKS: Primary | ICD-10-CM

## 2024-03-27 PROCEDURE — 99493 SBSQ PSYC COLLAB CARE MGMT: CPT | Performed by: INTERNAL MEDICINE

## 2024-03-27 PROCEDURE — 99494 1ST/SBSQ PSYC COLLAB CARE: CPT | Performed by: INTERNAL MEDICINE

## 2024-04-15 ENCOUNTER — SOCIAL WORK (OUTPATIENT)
Dept: PRIMARY CARE | Facility: CLINIC | Age: 54
End: 2024-04-15
Payer: COMMERCIAL

## 2024-04-15 ASSESSMENT — ANXIETY QUESTIONNAIRES
5. BEING SO RESTLESS THAT IT IS HARD TO SIT STILL: NOT AT ALL
4. TROUBLE RELAXING: SEVERAL DAYS
GAD7 TOTAL SCORE: 4
1. FEELING NERVOUS, ANXIOUS, OR ON EDGE: SEVERAL DAYS
7. FEELING AFRAID AS IF SOMETHING AWFUL MIGHT HAPPEN: SEVERAL DAYS
2. NOT BEING ABLE TO STOP OR CONTROL WORRYING: SEVERAL DAYS
IF YOU CHECKED OFF ANY PROBLEMS ON THIS QUESTIONNAIRE, HOW DIFFICULT HAVE THESE PROBLEMS MADE IT FOR YOU TO DO YOUR WORK, TAKE CARE OF THINGS AT HOME, OR GET ALONG WITH OTHER PEOPLE: SOMEWHAT DIFFICULT
6. BECOMING EASILY ANNOYED OR IRRITABLE: NOT AT ALL
3. WORRYING TOO MUCH ABOUT DIFFERENT THINGS: NOT AT ALL

## 2024-04-15 ASSESSMENT — PATIENT HEALTH QUESTIONNAIRE - PHQ9
6. FEELING BAD ABOUT YOURSELF - OR THAT YOU ARE A FAILURE OR HAVE LET YOURSELF OR YOUR FAMILY DOWN: NOT AT ALL
SUM OF ALL RESPONSES TO PHQ QUESTIONS 1-9: 4
5. POOR APPETITE OR OVEREATING: NOT AT ALL
3. TROUBLE FALLING OR STAYING ASLEEP: SEVERAL DAYS
10. IF YOU CHECKED OFF ANY PROBLEMS, HOW DIFFICULT HAVE THESE PROBLEMS MADE IT FOR YOU TO DO YOUR WORK, TAKE CARE OF THINGS AT HOME, OR GET ALONG WITH OTHER PEOPLE: SOMEWHAT DIFFICULT
7. TROUBLE CONCENTRATING ON THINGS, SUCH AS READING THE NEWSPAPER OR WATCHING TELEVISION: NOT AT ALL
1. LITTLE INTEREST OR PLEASURE IN DOING THINGS: NOT AT ALL
SUM OF ALL RESPONSES TO PHQ9 QUESTIONS 1 & 2: 1
4. FEELING TIRED OR HAVING LITTLE ENERGY: SEVERAL DAYS
2. FEELING DOWN, DEPRESSED OR HOPELESS: SEVERAL DAYS
8. MOVING OR SPEAKING SO SLOWLY THAT OTHER PEOPLE COULD HAVE NOTICED. OR THE OPPOSITE, BEING SO FIGETY OR RESTLESS THAT YOU HAVE BEEN MOVING AROUND A LOT MORE THAN USUAL: SEVERAL DAYS
9. THOUGHTS THAT YOU WOULD BE BETTER OFF DEAD, OR OF HURTING YOURSELF: NOT AT ALL

## 2024-04-15 NOTE — PROGRESS NOTES
Collaborative Care (CoCM)  Progress Note    Type of Interaction: In Office    Start Time: 12:15p    End Time: 1:15p        Appointment: Scheduled    Reason for Visit:   Chief Complaint   Patient presents with    CARMINA          Interval History / Patient Symptoms: Patient reports the waves of anxiety have not been as bad. Patient states in the moment he talked to himself at the time he talked himself through it.  Patient reports he has been having a hard time driving on the freeway. Patient has been working on managing the symptoms related to his anxiety. Patient has noticed there is tension in his neck. Patient has been managing his symptoms of anxiety by walking, yoga, hot baths. Patient states he pushes himself when he is feeling somewhat hesitant to follow through with an activity. Patient and BHM processed exposing himself to situations that make him feel uncomfortable. Patient and BHM discussed how anxiety can make us want to avoid certain situations. Patient reports on long drives he would prefer someone else to drive the car. Patient and BHM processed not trying to figure out where his anxiety comes from. Patient to slowly expose  himself to driving situations that he is not comfortable with. Patient and BHM processed that it is not possible to control every situation that he encounters. Patient and discussed celebrating the small victories when they happen.     No data recorded      Interventions Provided: [place] Setting, Behavioral Activation, Develop Coping Strategies, Review Progress/Goals Stress Management, and Mindfulness      Progress Made: Mixed    Response to Intervention:   Patient is easy to engage and can connect how he thinks with how he feels and then how he behaves.          Follow Up / Next Appointment:    April 30th 12p

## 2024-04-26 ENCOUNTER — DOCUMENTATION (OUTPATIENT)
Dept: PRIMARY CARE | Facility: CLINIC | Age: 54
End: 2024-04-26
Payer: COMMERCIAL

## 2024-04-26 DIAGNOSIS — F41.0 GENERALIZED ANXIETY DISORDER WITH PANIC ATTACKS: Primary | ICD-10-CM

## 2024-04-26 DIAGNOSIS — F41.1 GENERALIZED ANXIETY DISORDER WITH PANIC ATTACKS: Primary | ICD-10-CM

## 2024-04-30 ENCOUNTER — SOCIAL WORK (OUTPATIENT)
Dept: PRIMARY CARE | Facility: CLINIC | Age: 54
End: 2024-04-30
Payer: COMMERCIAL

## 2024-04-30 ENCOUNTER — DOCUMENTATION (OUTPATIENT)
Dept: PRIMARY CARE | Facility: CLINIC | Age: 54
End: 2024-04-30

## 2024-04-30 DIAGNOSIS — F41.1 GENERALIZED ANXIETY DISORDER WITH PANIC ATTACKS: Primary | ICD-10-CM

## 2024-04-30 DIAGNOSIS — F41.0 GENERALIZED ANXIETY DISORDER WITH PANIC ATTACKS: Primary | ICD-10-CM

## 2024-04-30 PROCEDURE — 99493 SBSQ PSYC COLLAB CARE MGMT: CPT | Performed by: INTERNAL MEDICINE

## 2024-04-30 PROCEDURE — 99494 1ST/SBSQ PSYC COLLAB CARE: CPT | Performed by: INTERNAL MEDICINE

## 2024-04-30 NOTE — PROGRESS NOTES
"Collaborative Care (CoCM)  Progress Note    Type of Interaction: In Office    Start Time: 12p    End Time: 1p        Appointment: Scheduled    Reason for Visit:   Chief Complaint   Patient presents with    CARMINA          Interval History / Patient Symptoms: Patient reports the last 3 days have been full of more anxiety. Patient reports feeling lightheaded, tension in his shoulders, patient reports he has been feeling unsettled. Patient and BHM processed what he has been dealing with and what coping skills he has been using. Patient will be picking up his daughter from college soon but he does not feel any extra symptoms of anxiety related to this. Patient reports he is sleeping fairly well each night.  Patient reports he could increase the amount he walks, he has been doing yoga consistently. Patient reports he is taking his meds daily. Patient reports the anxiety can go on for a few hours sometimes but he does notice a reduction in the 'waves' of anxiety happening. They typically don't. BHM reviewed symptoms of anxiety. Patient states one day when driving on the freeway anxiety was somewhat  more intense than in the past it lasted the entire time of the 20  minute drive. At times patient can feel nausea and blurred vision and fearful and uneasy when anxious. Patient and BHM discussed how to handle the anxiety in the moment, to label his feelings and embrace them. Patient and BHM discussed mindfulness and utilizing guided meditation. Patient and BHM discussed using mindfulness, deep breaths to cope. Patient states sometimes he just tells people he is doing \"good\" when he really isn't. Patient to be open to discussing how he is feeling and embracing the feelings as they come.BHM provided hand-out on feelings and different emotions.    No data recorded      Interventions Provided: Psychoeducation, Behavioral Activation, Develop Coping Strategies, Review Progress/Goals Stress Management, and Mindfulness      Progress " Made: Mixed    Response to Intervention: Patient is easy to engage and asks many questions for clarity as he is working on the journey of his mental health healing. Patient is open to process and utilizes many coping skills. Patient can identify the coping skills that work for him and that he has utilized.                    Follow Up / Next Appointment:    5/21/2024@[facility]

## 2024-05-21 ENCOUNTER — LAB (OUTPATIENT)
Dept: LAB | Facility: LAB | Age: 54
End: 2024-05-21
Payer: COMMERCIAL

## 2024-05-21 ENCOUNTER — APPOINTMENT (OUTPATIENT)
Dept: PRIMARY CARE | Facility: CLINIC | Age: 54
End: 2024-05-21
Payer: COMMERCIAL

## 2024-05-22 LAB — COTININE UR QL SCN: NEGATIVE

## 2024-06-10 ENCOUNTER — SOCIAL WORK (OUTPATIENT)
Dept: PRIMARY CARE | Facility: CLINIC | Age: 54
End: 2024-06-10
Payer: COMMERCIAL

## 2024-06-10 ASSESSMENT — PATIENT HEALTH QUESTIONNAIRE - PHQ9
8. MOVING OR SPEAKING SO SLOWLY THAT OTHER PEOPLE COULD HAVE NOTICED. OR THE OPPOSITE, BEING SO FIGETY OR RESTLESS THAT YOU HAVE BEEN MOVING AROUND A LOT MORE THAN USUAL: NOT AT ALL
4. FEELING TIRED OR HAVING LITTLE ENERGY: NOT AT ALL
2. FEELING DOWN, DEPRESSED OR HOPELESS: NOT AT ALL
10. IF YOU CHECKED OFF ANY PROBLEMS, HOW DIFFICULT HAVE THESE PROBLEMS MADE IT FOR YOU TO DO YOUR WORK, TAKE CARE OF THINGS AT HOME, OR GET ALONG WITH OTHER PEOPLE: NOT DIFFICULT AT ALL
SUM OF ALL RESPONSES TO PHQ9 QUESTIONS 1 & 2: 0
9. THOUGHTS THAT YOU WOULD BE BETTER OFF DEAD, OR OF HURTING YOURSELF: NOT AT ALL
3. TROUBLE FALLING OR STAYING ASLEEP: NOT AT ALL
7. TROUBLE CONCENTRATING ON THINGS, SUCH AS READING THE NEWSPAPER OR WATCHING TELEVISION: NOT AT ALL
5. POOR APPETITE OR OVEREATING: NOT AT ALL
SUM OF ALL RESPONSES TO PHQ QUESTIONS 1-9: 0
6. FEELING BAD ABOUT YOURSELF - OR THAT YOU ARE A FAILURE OR HAVE LET YOURSELF OR YOUR FAMILY DOWN: NOT AT ALL
1. LITTLE INTEREST OR PLEASURE IN DOING THINGS: NOT AT ALL

## 2024-06-10 ASSESSMENT — ANXIETY QUESTIONNAIRES
2. NOT BEING ABLE TO STOP OR CONTROL WORRYING: NOT AT ALL
GAD7 TOTAL SCORE: 0
6. BECOMING EASILY ANNOYED OR IRRITABLE: NOT AT ALL
7. FEELING AFRAID AS IF SOMETHING AWFUL MIGHT HAPPEN: NOT AT ALL
IF YOU CHECKED OFF ANY PROBLEMS ON THIS QUESTIONNAIRE, HOW DIFFICULT HAVE THESE PROBLEMS MADE IT FOR YOU TO DO YOUR WORK, TAKE CARE OF THINGS AT HOME, OR GET ALONG WITH OTHER PEOPLE: NOT DIFFICULT AT ALL
1. FEELING NERVOUS, ANXIOUS, OR ON EDGE: NOT AT ALL
4. TROUBLE RELAXING: NOT AT ALL
3. WORRYING TOO MUCH ABOUT DIFFERENT THINGS: NOT AT ALL
5. BEING SO RESTLESS THAT IT IS HARD TO SIT STILL: NOT AT ALL

## 2024-06-10 NOTE — PROGRESS NOTES
"Collaborative Care (CoCM)  Progress Note    Type of Interaction: In Office    Start Time: 11a    End Time: 12p        Appointment: Scheduled    Reason for Visit:   Chief Complaint   Patient presents with    CARMINA          Interval History / Patient Symptoms: Patient reports he notices that he used to be always in a rush. Patient noticed with his posture and how quickly he would move. Patient is learning to just be in the moment and how to be in the moment.  Patient reports he has recognized his state of mind to get things done. Patient has been slowing down to enjoy the moment. Patient reports he was often worn out after the fact.  Patient reports he does gratitude daily and he recognizes the little things as well.  Patient reports at times he 'forgets' that he has anxiety when he is going about his daily life.  Patient states at times he does feel the symptoms and they are starting to pass. Patient has learned not to \"fight against the anxiety\", he has learned to keep moving and accepted it and by doing this has helped him to move past it. Patient has been learning to \"go with the flow\".  Patient has been doing yoga, meditation, breathing exercises as coping skills as well. Patient has been eating well and sleeping well too.     No data recorded      Interventions Provided: Psychoeducation, Problem Solving Treatment, Behavioral Activation, Develop Coping Strategies, Review Progress/Goals Stress Management, and Mindfulness      Progress Made: Significant    Response to Intervention: Patient reports by being able to manage his symptoms of anxiety especially with his 2 daughters within the same household during the summer. Patient recognizes that he is handing his anxiety better and putting things in better perspective. Patient is working on self care and is going to find time to go fishing .                    Follow Up / Next Appointment:    July 1, 2024@11a    "

## 2024-06-24 ENCOUNTER — DOCUMENTATION (OUTPATIENT)
Dept: PRIMARY CARE | Facility: CLINIC | Age: 54
End: 2024-06-24
Payer: COMMERCIAL

## 2024-06-24 DIAGNOSIS — F41.1 GAD (GENERALIZED ANXIETY DISORDER): Primary | ICD-10-CM

## 2024-07-01 ENCOUNTER — APPOINTMENT (OUTPATIENT)
Dept: PRIMARY CARE | Facility: CLINIC | Age: 54
End: 2024-07-01
Payer: COMMERCIAL

## 2024-07-01 ASSESSMENT — PATIENT HEALTH QUESTIONNAIRE - PHQ9
1. LITTLE INTEREST OR PLEASURE IN DOING THINGS: NOT AT ALL
2. FEELING DOWN, DEPRESSED OR HOPELESS: NOT AT ALL
SUM OF ALL RESPONSES TO PHQ QUESTIONS 1-9: 0
3. TROUBLE FALLING OR STAYING ASLEEP: NOT AT ALL
7. TROUBLE CONCENTRATING ON THINGS, SUCH AS READING THE NEWSPAPER OR WATCHING TELEVISION: NOT AT ALL
8. MOVING OR SPEAKING SO SLOWLY THAT OTHER PEOPLE COULD HAVE NOTICED. OR THE OPPOSITE, BEING SO FIGETY OR RESTLESS THAT YOU HAVE BEEN MOVING AROUND A LOT MORE THAN USUAL: NOT AT ALL
5. POOR APPETITE OR OVEREATING: NOT AT ALL
10. IF YOU CHECKED OFF ANY PROBLEMS, HOW DIFFICULT HAVE THESE PROBLEMS MADE IT FOR YOU TO DO YOUR WORK, TAKE CARE OF THINGS AT HOME, OR GET ALONG WITH OTHER PEOPLE: NOT DIFFICULT AT ALL
SUM OF ALL RESPONSES TO PHQ9 QUESTIONS 1 & 2: 0
6. FEELING BAD ABOUT YOURSELF - OR THAT YOU ARE A FAILURE OR HAVE LET YOURSELF OR YOUR FAMILY DOWN: NOT AT ALL
9. THOUGHTS THAT YOU WOULD BE BETTER OFF DEAD, OR OF HURTING YOURSELF: NOT AT ALL
4. FEELING TIRED OR HAVING LITTLE ENERGY: NOT AT ALL

## 2024-07-01 ASSESSMENT — ANXIETY QUESTIONNAIRES
1. FEELING NERVOUS, ANXIOUS, OR ON EDGE: NOT AT ALL
5. BEING SO RESTLESS THAT IT IS HARD TO SIT STILL: NOT AT ALL
IF YOU CHECKED OFF ANY PROBLEMS ON THIS QUESTIONNAIRE, HOW DIFFICULT HAVE THESE PROBLEMS MADE IT FOR YOU TO DO YOUR WORK, TAKE CARE OF THINGS AT HOME, OR GET ALONG WITH OTHER PEOPLE: NOT DIFFICULT AT ALL
2. NOT BEING ABLE TO STOP OR CONTROL WORRYING: NOT AT ALL
4. TROUBLE RELAXING: NOT AT ALL
GAD7 TOTAL SCORE: 0
3. WORRYING TOO MUCH ABOUT DIFFERENT THINGS: NOT AT ALL
6. BECOMING EASILY ANNOYED OR IRRITABLE: NOT AT ALL
7. FEELING AFRAID AS IF SOMETHING AWFUL MIGHT HAPPEN: NOT AT ALL

## 2024-07-01 NOTE — PROGRESS NOTES
Collaborative Care (CoCM)  Progress Note    Type of Interaction: In Office    Start Time: 11a    End Time: 12p        Appointment: Scheduled    Reason for Visit:   Chief Complaint   Patient presents with    CARMINA          Interval History / Patient Symptoms: Patient reports overall he has been doing well. Patient states when feeling on edge he sat with the feelings of being nervous. Patient reports he can change his thoughts and reflects on how he has been doing. Patient reports he completed his normal routine he did not allow it to take over. Patient states a reduction in the symptoms. Patient notices that the timeframe has reduced a lot when he experiences the feelings. Patient reports he has been walking and doing yoga. Patient states that idle time can start up some anxiety for him.  Patient has [place]ing time [place] the lake, fishing and enjoying himself. Patient reports is enjoying himself much more recently. Patient notices that he is able to feel at peace in the moment.  Patient enjoys the connection to others while fishing.     No data recorded      Interventions Provided: Behavioral Activation and Mindfulness      Progress Made: Significant    Response to Intervention: Patient is doing well. Patient reflected on how far he has come and that he never thought he would be here. Patient is doing well and connects his reactions to how his mindset is                        Follow Up / Next Appointment:    Will call if needed

## 2024-07-23 DIAGNOSIS — I10 BENIGN ESSENTIAL HYPERTENSION: ICD-10-CM

## 2024-07-23 RX ORDER — AMLODIPINE BESYLATE 2.5 MG/1
2.5 TABLET ORAL DAILY
Qty: 90 TABLET | Refills: 0 | Status: SHIPPED | OUTPATIENT
Start: 2024-07-23 | End: 2024-10-21

## 2024-07-29 ENCOUNTER — DOCUMENTATION (OUTPATIENT)
Dept: PRIMARY CARE | Facility: CLINIC | Age: 54
End: 2024-07-29
Payer: COMMERCIAL

## 2024-07-29 DIAGNOSIS — F41.1 GAD (GENERALIZED ANXIETY DISORDER): Primary | ICD-10-CM

## 2024-08-07 DIAGNOSIS — F41.1 GENERALIZED ANXIETY DISORDER: ICD-10-CM

## 2024-08-08 RX ORDER — BUSPIRONE HYDROCHLORIDE 15 MG/1
15 TABLET ORAL 2 TIMES DAILY
Qty: 180 TABLET | Refills: 0 | Status: SHIPPED | OUTPATIENT
Start: 2024-08-08 | End: 2025-08-08

## 2024-09-11 ENCOUNTER — APPOINTMENT (OUTPATIENT)
Dept: PRIMARY CARE | Facility: CLINIC | Age: 54
End: 2024-09-11
Payer: COMMERCIAL

## 2024-10-22 DIAGNOSIS — I10 BENIGN ESSENTIAL HYPERTENSION: ICD-10-CM

## 2024-10-23 RX ORDER — AMLODIPINE BESYLATE 2.5 MG/1
2.5 TABLET ORAL DAILY
Qty: 30 TABLET | Refills: 1 | Status: SHIPPED | OUTPATIENT
Start: 2024-10-23

## 2024-12-10 ENCOUNTER — APPOINTMENT (OUTPATIENT)
Dept: PRIMARY CARE | Facility: CLINIC | Age: 54
End: 2024-12-10
Payer: COMMERCIAL

## 2024-12-17 DIAGNOSIS — I10 BENIGN ESSENTIAL HYPERTENSION: ICD-10-CM

## 2024-12-18 RX ORDER — AMLODIPINE BESYLATE 2.5 MG/1
2.5 TABLET ORAL DAILY
Qty: 30 TABLET | Refills: 1 | Status: SHIPPED | OUTPATIENT
Start: 2024-12-18

## 2025-02-25 DIAGNOSIS — I10 BENIGN ESSENTIAL HYPERTENSION: ICD-10-CM

## 2025-02-25 RX ORDER — AMLODIPINE BESYLATE 2.5 MG/1
2.5 TABLET ORAL DAILY
Qty: 30 TABLET | Refills: 1 | Status: SHIPPED | OUTPATIENT
Start: 2025-02-25

## 2025-03-04 ENCOUNTER — APPOINTMENT (OUTPATIENT)
Dept: PRIMARY CARE | Facility: CLINIC | Age: 55
End: 2025-03-04
Payer: COMMERCIAL

## 2025-03-19 ENCOUNTER — APPOINTMENT (OUTPATIENT)
Dept: PRIMARY CARE | Facility: CLINIC | Age: 55
End: 2025-03-19
Payer: COMMERCIAL

## 2025-04-15 ENCOUNTER — APPOINTMENT (OUTPATIENT)
Dept: PRIMARY CARE | Facility: CLINIC | Age: 55
End: 2025-04-15
Payer: COMMERCIAL

## 2025-04-30 ENCOUNTER — OFFICE VISIT (OUTPATIENT)
Dept: PRIMARY CARE | Facility: CLINIC | Age: 55
End: 2025-04-30

## 2025-04-30 VITALS
SYSTOLIC BLOOD PRESSURE: 144 MMHG | OXYGEN SATURATION: 98 % | DIASTOLIC BLOOD PRESSURE: 83 MMHG | HEART RATE: 104 BPM | TEMPERATURE: 97.8 F

## 2025-04-30 DIAGNOSIS — Z00.00 HEALTHCARE MAINTENANCE: ICD-10-CM

## 2025-04-30 DIAGNOSIS — E78.5 HYPERLIPIDEMIA, UNSPECIFIED HYPERLIPIDEMIA TYPE: ICD-10-CM

## 2025-04-30 DIAGNOSIS — I10 BENIGN ESSENTIAL HYPERTENSION: Primary | ICD-10-CM

## 2025-04-30 PROCEDURE — 3077F SYST BP >= 140 MM HG: CPT

## 2025-04-30 PROCEDURE — 1036F TOBACCO NON-USER: CPT

## 2025-04-30 PROCEDURE — 99214 OFFICE O/P EST MOD 30 MIN: CPT

## 2025-04-30 PROCEDURE — 3079F DIAST BP 80-89 MM HG: CPT

## 2025-04-30 RX ORDER — AMLODIPINE BESYLATE 2.5 MG/1
2.5 TABLET ORAL DAILY
Qty: 90 TABLET | Refills: 1 | Status: SHIPPED | OUTPATIENT
Start: 2025-04-30

## 2025-04-30 ASSESSMENT — PATIENT HEALTH QUESTIONNAIRE - PHQ9
SUM OF ALL RESPONSES TO PHQ9 QUESTIONS 1 AND 2: 0
2. FEELING DOWN, DEPRESSED OR HOPELESS: NOT AT ALL
1. LITTLE INTEREST OR PLEASURE IN DOING THINGS: NOT AT ALL

## 2025-04-30 ASSESSMENT — COLUMBIA-SUICIDE SEVERITY RATING SCALE - C-SSRS
1. IN THE PAST MONTH, HAVE YOU WISHED YOU WERE DEAD OR WISHED YOU COULD GO TO SLEEP AND NOT WAKE UP?: NO
2. HAVE YOU ACTUALLY HAD ANY THOUGHTS OF KILLING YOURSELF?: NO
6. HAVE YOU EVER DONE ANYTHING, STARTED TO DO ANYTHING, OR PREPARED TO DO ANYTHING TO END YOUR LIFE?: NO

## 2025-04-30 NOTE — PROGRESS NOTES
Subjective   Patient ID: Saud He is a 54 y.o. male who presents for Med Refill and Establish Care.    HPI  Patient here for follow-up and to establish care.  He is self-pay.  Patient inquiring about his need to take the blood pressure medication.  Checking blood pressure at home and his readings are in the 130s to the 180s no higher than 90 diastolic.  Educated patient on compliance of medication and things he can do to decrease comorbidities.  Recommended vaccines at health department.  Also encouraged patient to get the labs if possible.  He attributes his increased blood pressure to his struggle with anxiety during the COVID pandemic.  It is getting better but still feels anxious at times.        Review of Systems  All pertinent positive symptoms are included in the history of present illness.  All other systems have been reviewed and are negative and noncontributory to this patient's current ailments.    Current Outpatient Medications   Medication Instructions    amLODIPine (NORVASC) 2.5 mg, oral, Daily     Objective   Visit Vitals  /83   Pulse 104   Temp 36.6 °C (97.8 °F)   SpO2 98%   Smoking Status Never       Physical Exam  CONSTITUTIONAL - well nourished, well developed, looks like stated age, in no acute distress.  SKIN - normal skin color and pigmentation, normal skin turgor without rash, lesions, or nodules visualized  HEAD - no trauma, normocephalic  EYES - pupils are equal and reactive to light, and extraocular muscles are intact  ENT - TM's intact, no signs of infection, uvula midline, and throat normal, no exudate, nasal passage without discharge.  NECK - supple without rigidity, no neck mass was observed, no thyromegaly or thyroid nodules  CHEST - clear to auscultation, no wheezing, no crackles and no rales, good effort  CARDIAC - regular rate and regular rhythm, no skipped beats, no murmur  ABDOMEN - no organomegaly, soft, nontender, nondistended, normal bowel sounds, no  guarding/rebound/rigidity.  EXTREMITIES - no obvious or evident edema, no obvious or evident deformities  NEUROLOGICAL - normal gait, normal balance, no ataxia, alert, oriented  PSYCHIATRIC - alert, pleasant and age-appropriate  IMMUNOLOGIC - no cervical lymphadenopathy     Assessment/Plan   Assessment & Plan  Benign essential hypertension  - Continue amlodipine 2.5 mg daily  -Continue checking blood pressures at home in the morning  -Low-salt, DASH diet, weight loss encouraged  Hyperlipidemia, unspecified hyperlipidemia type  - Check fasting labs    Orders Placed This Encounter   Procedures    Comprehensive Metabolic Panel    Lipid Panel    TSH with reflex to Free T4 if abnormal    CBC and Auto Differential       Return to office in months or sooner if needed.  If you should experience concerning symptoms, go to the nearest Emergency Department.      Mag Isaacs, APRN-CNP

## 2025-04-30 NOTE — ASSESSMENT & PLAN NOTE
- Continue amlodipine 2.5 mg daily  -Continue checking blood pressures at home in the morning  -Low-salt, DASH diet, weight loss encouraged

## 2025-05-21 ENCOUNTER — APPOINTMENT (OUTPATIENT)
Dept: PRIMARY CARE | Facility: CLINIC | Age: 55
End: 2025-05-21